# Patient Record
Sex: FEMALE | Race: WHITE
[De-identification: names, ages, dates, MRNs, and addresses within clinical notes are randomized per-mention and may not be internally consistent; named-entity substitution may affect disease eponyms.]

---

## 2020-01-01 ENCOUNTER — HOSPITAL ENCOUNTER (EMERGENCY)
Dept: HOSPITAL 39 - ER | Age: 35
Discharge: HOME | End: 2020-01-01
Payer: COMMERCIAL

## 2020-01-01 VITALS — SYSTOLIC BLOOD PRESSURE: 111 MMHG | OXYGEN SATURATION: 96 % | DIASTOLIC BLOOD PRESSURE: 68 MMHG | TEMPERATURE: 96 F

## 2020-01-01 DIAGNOSIS — O09.523: ICD-10-CM

## 2020-01-01 DIAGNOSIS — K02.9: ICD-10-CM

## 2020-01-01 DIAGNOSIS — O99.613: Primary | ICD-10-CM

## 2020-01-01 DIAGNOSIS — Z88.8: ICD-10-CM

## 2020-01-01 DIAGNOSIS — Z88.6: ICD-10-CM

## 2020-01-01 DIAGNOSIS — Z3A.38: ICD-10-CM

## 2020-01-01 RX ADMIN — CLINDAMYCIN HYDROCHLORIDE ONE MG: 150 CAPSULE ORAL at 15:26

## 2020-01-01 RX ADMIN — BUTALBITAL, ACETAMINOPHEN AND CAFFEINE ONE EA: 50; 325; 40 TABLET ORAL at 15:25

## 2020-01-01 NOTE — ED.PDOC
History of Present Illness





- General


Chief Complaint: Dental/Mouth


Stated Complaint: right upper jaw pain


Time Seen by Provider: 01/01/20 14:40


Source: patient


Exam Limitations: no limitations





- History of Present Illness


Initial Comments: 





the patient's 34-year-old  female presenting to emergency room 

secondary to dental pain in the right posterior upper molars.  This started last

night.  No fever.  No obvious abscess formation.  There is obvious inflammation.

 She does have multiple dental caries in the area.  She has 38 weeks pregnant.  

No other problems currently.


Timing/Duration: 24 hours, getting worse


Improving Factors: nothing


Worsening Factors: nothing


Associated Symptoms: denies symptoms


Allergies/Adverse Reactions: 


Allergies





Acetaminophen [From Darvocet-N] Allergy (Verified 01/01/20 14:51)


   


Propoxyphene [From Darvocet-N] Allergy (Verified 01/01/20 14:51)


   


Tramadol Allergy (Verified 01/01/20 14:51)


   





Home Medications: 


Ambulatory Orders





Acetaminophen-Caff-Butalbital [Fioricet] 1 ea PO Q8H PRN #21 tab 01/01/20 


Clindamycin HCl 300 mg PO Q8HR #20 cap 01/01/20 











Review of Systems





- Review of Systems


Constitutional: States: no symptoms reported


EENTM: States: see HPI


Respiratory: States: no symptoms reported


Cardiology: States: no symptoms reported


Gastrointestinal/Abdominal: States: no symptoms reported


Genitourinary: States: no symptoms reported


Musculoskeletal: States: no symptoms reported


Skin: States: no symptoms reported


Neurological: States: no symptoms reported


Endocrine: States: no symptoms reported


Hematologic/Lymphatic: States: no symptoms reported


All other Systems: No Change from Baseline





Physical Exam





- Physical Exam


General Appearance: Alert, No apparent distress


Eye Exam: bilateral normal


Ears, Nose, Throat: hearing grossly normal, other - see history of present 

illness


Neck: full range of motion, supple


Respiratory: lungs clear, normal breath sounds, no respiratory distress, no 

accessory muscle use


Cardiovascular/Chest: normal peripheral pulses, regular rate, rhythm, no edema


Peripheral Pulses: radial,right: 2+, radial,left: 2+


Gastrointestinal/Abdominal: non tender - gravid, soft


Rectal Exam: deferred


Extremity: normal range of motion, no pedal edema, normal capillary refill


Neurologic: CNs II-XII nml as tested, alert, normal mood/affect, oriented x 3


Skin Exam: normal color





Progress





- Progress


Progress: 





01/01/20 15:13


the patient is a 34-year-old  female presenting with infected dental 

caries to the right upper molars.  The patient is being given a dose of clind

amycin and Rocephin.  She will be written for clindamycin for use for the next 

week.  She'll also be written for some Fioricet for pain control.  The patient 

reports that she is not allergic to Tylenol.  ER warnings were given for any 

worsening.  Follow up with her obstetrician.  She does need to see a dentist as 

well once she delivers.





lillian moody [id number]





Departure





- Departure


Clinical Impression: 


 Dental caries





ICD-10 Supporting Text: 





infected


Disposition: Discharge to Home or Self Care


Condition: Fair


Departure Forms:  ED Discharge - Pt. Copy, Patient Portal Self Enrollment


Instructions:  DI for Dental Pain


Diet: regular diet


Activity: increase activity as tolerated


Prescriptions: 


Clindamycin HCl 300 mg PO Q8HR #20 cap


Acetaminophen-Caff-Butalbital [Fioricet] 1 ea PO Q8H PRN #21 tab


 PRN Reason: Pain


Home Medications: 


Ambulatory Orders





Acetaminophen-Caff-Butalbital [Fioricet] 1 ea PO Q8H PRN #21 tab 01/01/20 


Clindamycin HCl 300 mg PO Q8HR #20 cap 01/01/20 








Additional Instructions: 


the patient is a 34-year-old  female presenting with infected dental 

caries to the right upper molars.  The patient is being given a dose of 

clindamycin and Rocephin.  She will be written for clindamycin for use for the 

next week.  She'll also be written for some Fioricet for pain control.  The 

patient reports that she is not allergic to Tylenol.  ER warnings were given for

 any worsening.  Follow up with her obstetrician.  She does need to see a 

dentist as well once she delivers.

## 2020-12-31 ENCOUNTER — HOSPITAL ENCOUNTER (OUTPATIENT)
Dept: HOSPITAL 39 - YCFC.O | Age: 35
End: 2020-12-31
Attending: NURSE PRACTITIONER
Payer: COMMERCIAL

## 2020-12-31 DIAGNOSIS — Z20.828: Primary | ICD-10-CM

## 2021-01-04 ENCOUNTER — HOSPITAL ENCOUNTER (EMERGENCY)
Dept: HOSPITAL 39 - ER | Age: 36
Discharge: HOME | End: 2021-01-04
Payer: SELF-PAY

## 2021-01-04 VITALS — SYSTOLIC BLOOD PRESSURE: 120 MMHG | OXYGEN SATURATION: 97 % | DIASTOLIC BLOOD PRESSURE: 83 MMHG

## 2021-01-04 VITALS — TEMPERATURE: 98.6 F
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
## 2021-01-04 DIAGNOSIS — Z88.5: ICD-10-CM
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
## 2021-01-04 DIAGNOSIS — S05.02XA: Primary | ICD-10-CM

## 2021-01-04 RX ADMIN — TETRACAINE HYDROCHLORIDE ONE DROP: 5 SOLUTION OPHTHALMIC at 01:11

## 2021-01-04 RX ADMIN — HYDROCODONE BITARTRATE AND ACETAMINOPHEN ONE EA: 10; 325 TABLET ORAL at 01:18

## 2021-01-04 RX ADMIN — CIPROFLOXACIN ONE DROP: 3 SOLUTION OPHTHALMIC at 01:18

## 2021-01-04 NOTE — ED.PDOC
History of Present Illness





- General


Chief Complaint: Eye Problems


Stated Complaint: possible left eye papercut


Time Seen by Provider: 01/04/21 01:04


Additional Information: 





Patient is a 35-year-old female who presents to the ED with chief complaint of 

scratch to her left eye.  Patient indicates that she was playing with her 

daughter this afternoon at approximately 4 PM.  Her daughter had paper in her 

hand and the paper somehow scratched her eye.  Patient had mild pain initially 

but it has slowly gotten worse and it is moderate to severe presently.  Patient 

also has blurred vision in the eye.  There is no discharge.  Patient does not we

ar contact lenses or glasses.  She is otherwise asymptomatic.





- History of Present Illness


Allergies/Adverse Reactions: 


Allergies





Propoxyphene [From Darvocet-N] Allergy (Verified 01/01/20 14:51)


   


Tramadol Allergy (Verified 01/01/20 14:51)


   





Home Medications: 


Ambulatory Orders





Acetaminophen-Caff-Butalbital [Fioricet] 1 ea PO Q8H PRN #21 tab 01/01/20 


Clindamycin HCl 300 mg PO Q8HR #20 cap 01/01/20 


Acetaminophen W/ Codeine [Tylenol W/ CODEINE #3] 1 ea PO Q6H PRN #20  01/04/21 











Review of Systems





- Review of Systems


Constitutional: States: no symptoms reported.  Denies: chills, fever


EENTM: States: see HPI


Respiratory: States: no symptoms reported.  Denies: cough, short of breath


Cardiology: States: no symptoms reported.  Denies: chest pain, palpitations


Gastrointestinal/Abdominal: States: no symptoms reported


All other Systems: Reviewed and Negative





Past Medical History (General)





- Patient Medical History


Hx Seizures: No


Hx Stroke: No


Hx Dementia: No


Hx Asthma: No


Hx of COPD: No


Hx Cardiac Disorders: No


Hx Congestive Heart Failure: No


Hx Pacemaker: No


Hx Hypertension: No


Hx Thyroid Disease: No


Hx Diabetes: No


Hx Gastroesophageal Reflux: No


Hx Renal Disease: No


Hx Cancer: No


Hx of HIV: No


Hx Hepatitis C: No


Hx MRSA: No





- Vaccination History


Hx Tetanus, Diphtheria Vaccination: Yes - 2-3 years ago


Hx Influenza Vaccination: No





- Social History


Hx Tobacco Use: No


Hx Alcohol Use: No





Family Medical History





- Family History


  ** Mother


Family History: No Known





Physical Exam





- Physical Exam


General Appearance: Obvious distress, Well Developed, Well Nourished


Eye Exam: left other - Right eye normal inspection.  Lids and lashes bilateral 

eyes normal.  Left eye with diffuse uptake of fluorescein dye on Woods lamp 

testing over the pupil and iris.  Extraocular muscles are normal.  PERRLA.


Neck: supple, normal inspection


Cardiovascular/Respiratory: no respiratory distress


Neurologic: no motor/sensory deficits, alert, normal mood/affect, oriented x 3


Skin Exam: normal color, warm/dry





Progress





- Progress


Progress: 





01/04/21 01:20


Patient's pain is completely relieved following tetracaine administration to the

eye.  Patient has diffuse fluorescein uptake to the left eye and I suspect that 

the foreign body has abraded patient's cornea diffusely.  Patient is able to 

count fingers but is unable to read the eye chart.  Cipro drops have been placed

in patient's eye and I have discussed with her that she should place a drop in 

the eye every hour until she is seen by The eye doctor at [facility] in the

morning.  I discussed with patient absolute need to follow-up with the 

ophthalmologist later on today (Monday) for reevaluation and definitive 

management and care.  Patient voices understanding and willingness to comply 

with plan.  Return to ED precautions discussed to include uncontrolled pain, 

discharge from the eye, worsening photophobia.





01/04/21 01:26


Note: Narcotic E prescription function is inoperative and patient will require 

hardcopy Tylenol 3 prescription.





Departure





- Departure


Clinical Impression: 


Corneal abrasion


Qualifiers:


 Encounter type: initial encounter Laterality: left Qualified Code(s): S05.02XA 

- Injury of conjunctiva and corneal abrasion without foreign body, left eye, 

initial encounter





Time of Disposition: 01:23


Disposition: Discharge to Home or Self Care


Condition: Good


Departure Forms:  ED Discharge - Pt. Copy, Patient Portal Self Enrollment


Instructions:  DI for Eye Pain, Corneal Abrasion (DC)


Prescriptions: 


Acetaminophen W/ Codeine [Tylenol W/ CODEINE #3] 1 ea PO Q6H PRN #20 


 PRN Reason: Pain


Home Medications: 


Ambulatory Orders





Acetaminophen-Caff-Butalbital [Fioricet] 1 ea PO Q8H PRN #21 tab 01/01/20 


Clindamycin HCl 300 mg PO Q8HR #20 cap 01/01/20 


Acetaminophen W/ Codeine [Tylenol W/ CODEINE #3] 1 ea PO Q6H PRN #20  01/04/21 








Additional Instructions: 


Call [facility] first thing this morning to make a follow-up appointment 

for later on today. Be certain to use the Cipro eyedrops given to you today in 

the emergency room to take home hourly, 1 drop to the left eye until seen by the

ophthalmologist.